# Patient Record
Sex: MALE | Race: WHITE | HISPANIC OR LATINO | Employment: FULL TIME | URBAN - METROPOLITAN AREA
[De-identification: names, ages, dates, MRNs, and addresses within clinical notes are randomized per-mention and may not be internally consistent; named-entity substitution may affect disease eponyms.]

---

## 2020-08-10 ENCOUNTER — OFFICE VISIT (OUTPATIENT)
Dept: FAMILY MEDICINE CLINIC | Facility: CLINIC | Age: 45
End: 2020-08-10

## 2020-08-10 VITALS
RESPIRATION RATE: 20 BRPM | DIASTOLIC BLOOD PRESSURE: 78 MMHG | TEMPERATURE: 98.2 F | HEIGHT: 67 IN | WEIGHT: 200.8 LBS | BODY MASS INDEX: 31.52 KG/M2 | HEART RATE: 65 BPM | OXYGEN SATURATION: 97 % | SYSTOLIC BLOOD PRESSURE: 130 MMHG

## 2020-08-10 DIAGNOSIS — Z11.59 NEED FOR HEPATITIS B SCREENING TEST: ICD-10-CM

## 2020-08-10 DIAGNOSIS — J30.9 ALLERGIC RHINITIS, UNSPECIFIED SEASONALITY, UNSPECIFIED TRIGGER: ICD-10-CM

## 2020-08-10 DIAGNOSIS — E66.09 CLASS 1 OBESITY DUE TO EXCESS CALORIES WITHOUT SERIOUS COMORBIDITY WITH BODY MASS INDEX (BMI) OF 31.0 TO 31.9 IN ADULT: Primary | ICD-10-CM

## 2020-08-10 DIAGNOSIS — Z11.4 SCREENING FOR HIV (HUMAN IMMUNODEFICIENCY VIRUS): ICD-10-CM

## 2020-08-10 DIAGNOSIS — Z11.59 NEED FOR HEPATITIS C SCREENING TEST: ICD-10-CM

## 2020-08-10 PROCEDURE — 99203 OFFICE O/P NEW LOW 30 MIN: CPT | Performed by: FAMILY MEDICINE

## 2020-08-10 PROCEDURE — 3725F SCREEN DEPRESSION PERFORMED: CPT | Performed by: FAMILY MEDICINE

## 2020-08-10 PROCEDURE — 1036F TOBACCO NON-USER: CPT | Performed by: FAMILY MEDICINE

## 2020-08-10 PROCEDURE — 3008F BODY MASS INDEX DOCD: CPT | Performed by: FAMILY MEDICINE

## 2020-08-10 RX ORDER — LORATADINE 10 MG/1
10 TABLET ORAL DAILY
Qty: 30 TABLET | Refills: 5 | Status: SHIPPED | OUTPATIENT
Start: 2020-08-10 | End: 2022-06-08

## 2020-08-10 NOTE — PATIENT INSTRUCTIONS
Alergias   CUIDADO AMBULATORIOSallee Brandt  son Charanjit Swapnil reacción del sistema inmunológico a joseph sustancia llamada alérgeno  El sistema inmunológico ve el alérgeno kyung joseph amenaza y lo ataca  Los signos y síntomas más comunes incluyen los siguientes:   · Síntomas leves  incluyen estornudos y flujo, picazón o congestión nasal  También puede presentar ojos inflamados, llorosos o con picazón, o picazón en la piel  Puede tener inflamación o dolor en el área de la picadura de insecto  · Los síntomas de la anafilaxia  incluyen dificultad para respirar o tragar, un salpullido o ronchas al igual que inflamación severa  También puede tener tos, un hailee silbante y chillón en el pecho al exhalar conocido kyung sibilancias, o joseph sensación de desvanecimiento o mareos  La anafilaxia es joseph reacción repentina y de peligro mortal que requiere de tratamiento inmediato  Llame al 911 en lawrence de presentar signos o síntomas de anafilaxia,  kyung dificultad para respirar, inflamación en pulliam boca o garganta, o sibilancias  También es posible que tenga comezón, sarpullido, urticaria o sienta que se va a desmayar  Busque atención médica de inmediato si:   · Tiene la sensación de hormigueo en las pascale o pies  · Pulliam piel está enrojecida o P[place] Poster  Pregúntele a pulliam Heddy Porsche vitaminas y minerales son adecuados para usted  · Usted tiene preguntas o inquietudes acerca de pulliam condición o cuidado  Pautas que necesito seguir para los signos o síntomas de la anafilaxia:   · Inmediatamente  aplíquese 1 inyección de epinefrina ricarda hágalo solamente en el músculo del muslo que da hacia afuera  · Deje la inyección en el lugar  según las indicaciones  Es probable que pulliam médico le recomiende que se la deje puesta por hasta 10 segundos antes de quitarla  [place] ayuda a asegurarse de que toda la epinefrina sea aplicada  · Llame al 911 y vaya al servicio de [place],  aunque la inyección haya skyler mario síntomas   No maneje usted mismo  Lleve con usted la inyección de epinefrina que usó  De tratamientos para las alergias  podría incluir cualquiera de los siguientes:  · Los antihistamínicos  sirven para reducir el comezón, estornudo e inflamación  Usted los puede curt en forma de píldora o de gotas en mario ojos o nariz  · Los descongestionantes  ayudan a que pulliam nariz se sienta menos congestionada  · Esteroides  disminuyen la inflamación y el enrojecimiento  · Los tratamientos de uso tópico  ayudan a reducir el comezón o inflamación  Usted también podría recibir [employer] o gotas para los ojos  · La epinefrina  es un medicamento usado para tratar reacciones alérgicas graves kyung la [place]  · La insensibilización  hace que el cuerpo se acostumbre a los alérgenos que no puede evitar  Pulliam médico le administra joseph inyección que contiene joseph pequeña cantidad de alérgeno  El médico trata cualquier reacción alérgica que usted pueda tener  Luego le [employer] alérgeno poco a poco hasta que pulliam cuerpo se acostumbre a éste  Pulliam reacción al alérgeno podría ser menos seria después de éste tratamiento  Pulliam médico le va a indicar por cuánto tiempo usted va a utilizar las inyecciones  Precauciones de seguridad a curt si usted corre riesgo de anafilaxia:   · Tenga a mano 2 inyecciones de epinefrina en todo momento  Puede que usted necesite joseph segunda inyección ya que la epinefrina solamente actúa por aproximadamente 20 minutos y los síntomas podrían regresar  Pulliam médico puede mostrarle a usted y a mario familiares cómo aplicar la inyección  Revise la fecha de vencimiento todos los meses y reemplace el medicamento de pulliam vencimiento  · [place] un plan de acción  Pulliam médico puede ayudarle a crear un plan escrito que explique la alergia y un plan de emergencia para tratar joseph reacción  El plan explica cuándo aplicar joseph segunda inyección de epinefrina si los síntomas vuelven o no mejoran después de la primera inyección  Asegúrese de que tiara familiares, personal de pulliam trabajo y escuela tengan joseph copia del plan de acción e instrucciones de emergencia  Muéstreles cómo aplicar la inyección de epinefrina  · Tenga cuidado cuando arnaldo ejercicio  Si usted tuvo anafilaxis inducida por el ejercicio, no se ejercite inmediatamente después de comer  Pare de ejercitarse de inmediato si empieza a desarrollar cualquier signo o síntoma de anafilaxia  Puede que al principio se sienta cansado, caliente o tenga comezón en la piel  También podría desarrollar urticaria, inflamación y problemas graves de respiración si continúa ejercitándose  · Lleve joseph identificación de [employer]  Use un brazalete o collar de alerta médica o lleve joseph tarjeta que explique la alergia  Pregúntele a pulliam médico dónde conseguir estos artículos  · Informe a todos los [place]  Estos incluyen a los odontólogos, enfermeras, médicos y cirujanos  Controle las alergias:   · Use enjuagues nasales según las indicaciones  El lavado nasal diario podría ayudar a limpiar pulliam nariz de alérgenos  · No fume  Los síntomas de la alergia disminuyen si no está alrededor del humo  La nicotina y otras sustancias químicas que contienen los cigarrillos y cigarros pueden dañar los pulmones  Pida información a pulliam médico si usted actualmente fuma y necesita ayuda para dejar de fumar  Los cigarrillos electrónicos o tabaco sin humo todavía contienen nicotina  Consulte con pulliam médico antes de [employer]  Prevenga joseph reacción alérgica:   · No salga afuera cuando el recuento de polen esté muy alto en law[place] de sufrir de alergias estacionales  Tiara síntomas podrían mejorar si usted sale afuera solo por la mañana o la noche  Use el aire acondicionado y [employer] filtros de aire a [place]  · Evite el polvo, pelaje y moho  Limpie el polvo y aspire pulliam hogar a menudo  Es posible que usted necesite usar joseph máscara al [place]   Mantenga a las mascotas en ciertas habitaciones y báñelos frecuentemente  Use un deshumidificador (máquina que reduce la humedad) para ayudar a evitar el moho  · No use productos que contengan látex en [place] de tener alergia al látex  Si usted trabaja en la cassy de la oswaldo o preparando alimentos, utilice guantes sin látex  Siempre informe a los médicos si usted tiene joseph alergia al látex  · Evite áreas o actividades que atraen a los insectos en lawrence que usted tenga joseph alergia a las picaduras de insectos  Las [employer] botes aakash Villalta y roland aakash [facility]  No use ropa de colores brillantes ni perfumes latoya al estar afuera  Acuda a mario consultas de control con reynaga médico según le indicaron  Anote mario preguntas para que se acuerde de hacerlas drew mario visitas  Cuando tenga joseph reacción alérgica, anote todo a lo que estuvo expuesto en las últimas 2 horas antes de la reacción  Lleve esta información a reynaga próxima vero  © 2017 [address] [employer] Information is for End User's use only and may not be sold, redistributed or otherwise used for commercial purposes  All illustrations and images included in CareNotes® are the copyrighted property of A D A M , Inc  or Nelson Amaya  Esta información es sólo para uso en educación  Reynaga intención no es darle un consejo médico sobre enfermedades o tratamientos  Colsulte con reynaga Kay Marquez farmacéutico antes de seguir cualquier régimen médico para saber si es seguro y efectivo para usted

## 2020-08-10 NOTE — PROGRESS NOTES
Chief Complaint   Patient presents with    Allergic Reaction     Blood Pressure: 130/78, Pulse: 65, Respirations: 20, Temperature: 98 2 °F (36 8 °C), Temp Source: Temporal, SpO2: 97 %, Weight - Scale: 91 1 kg (200 lb 12 8 oz), Height: 5' 7" (170 2 cm), Pain Score: 0-No pain     Assessment/Plan  1  Class 1 obesity  Labs indicated include A1c and lipid panel for screening purposes  Continue with exercise and lifestyle modification  His build is muscular side presumed that his BMI is artificially elevated  2   allergies  Recommended restarting nonsedating antihistamine in the morning and if symptoms are persisting he can use Benadryl at night  We discussed that he does not really need to take the medication every day unless she is symptomatic and after 1 week he can switch to an as-needed basis unless his symptoms recur and persist   3    RTO p r n       The above was discussed in layman's terms, the patient was engaged using the shared-decision making process and verbalized understanding of the treatment plan  All questions were answered to the patient's satisfaction  Diagnoses and all orders for this visit:    Class 1 obesity due to excess calories without serious comorbidity with body mass index (BMI) of 31 0 to 31 9 in adult  -     Hemoglobin A1C; Future  -     Lipid panel; Future    Screening for HIV (human immunodeficiency virus)  -     HIV 1/2 Antigen/Antibody (4th Generation) w Reflex [facility]; Future    Allergic rhinitis, unspecified seasonality, unspecified trigger  -     loratadine (CLARITIN) 10 mg tablet; Take 1 tablet (10 mg total) by mouth daily    Need for hepatitis B screening test  -     Hepatitis B surface antigen; Future    Need for hepatitis C screening test  -     Hepatitis C antibody; Future          Subjective/HPI  1  Patient Is not well known to me  2   new patient  Frisian speaker  Prefers daughter was present with him to translate      3  Complains of allergy symptoms for many years   Symptoms include itching of his arms, runny nose, watering eyes  He takes Benadryl and it resolves the symptoms but makes him sleepy  In the past he has tried nonsedating antihistamines but was not aware that he could continue to take them as needed going forward  All symptoms resolve after taking antihistamines  4  He a would like a chest x-ray and all blood work done  Specifies hepatitis B and C, as well as prostate  He has no past medical history but is concerned because his father passed away from lung cancer, but he was also a smoker  5    Last tetanus shot done 6 years ago  Review of Systems  Constitutional: Negative for activity change, appetite change, chills and fever  Respiratory: Negative for shortness of breath  Cardiovascular: Negative for chest pain  Gastrointestinal: Negative for blood in stool, nausea and vomiting  Genitourinary: Negative for difficulty urinating and dysuria  Psychiatric/Behavioral: Negative for behavioral problems  Pertinent items are noted in chief complaint and HPI  Social History   reports that he has never smoked  He has never used smokeless tobacco     has no history on file for alcohol     has no history on file for drug  Objective  Physical Exam   Constitutional: He is oriented to person, place, and time  He appears well-developed and well-nourished  No distress  HENT:   Head: Normocephalic and atraumatic  Eyes: Conjunctivae are normal    Neck: Normal range of motion  Cardiovascular: Normal rate, regular rhythm and normal heart sounds  No murmur heard  Pulmonary/Chest: Effort normal and breath sounds normal  No respiratory distress  He has no wheezes  Musculoskeletal: Normal range of motion  He exhibits no edema  Neurological: He is alert and oriented to person, place, and time  Psychiatric: He has a normal mood and affect  His behavior is normal    Nursing note and vitals reviewed      This note has been dictated using M*Modal software  It may contain errors, including improperly dictated words  Please contact physician directly for any questions  Chart Review/Health Maintenance   The following have been updated:   Medications    No Known Allergies  Current Outpatient Medications:     loratadine (CLARITIN) 10 mg tablet, Take 1 tablet (10 mg total) by mouth daily, Disp: 30 tablet, Rfl: 5    Patient Active Problem List   Diagnosis    Class 1 obesity due to excess calories without serious comorbidity with body mass index (BMI) of 31 0 to 31 9 in adult     No past surgical history on file    Family History   Problem Relation Age of Onset    Lung cancer Father      Health Maintenance   Topic Date Due    HIV Screening  08/01/1992    BMI: Followup Plan  08/01/1995    Annual Physical  08/01/1995    Influenza Vaccine  07/01/2020    DTaP,Tdap,and Td Vaccines (1 - Tdap) 01/01/2024 (Originally 8/1/1998)    Depression Screening PHQ  08/10/2021    BMI: Adult  08/10/2021    Pneumococcal Vaccine: Pediatrics (0 to 5 Years) and At-Risk Patients (6 to 59 Years)  Aged Out    HIB Vaccine  Aged Out    Hepatitis B Vaccine  Aged Out    IPV Vaccine  Aged Out    Hepatitis A Vaccine  Aged Out    Meningococcal ACWY Vaccine  Aged Out    HPV Vaccine  Aged Out

## 2020-08-12 ENCOUNTER — APPOINTMENT (OUTPATIENT)
Dept: LAB | Facility: CLINIC | Age: 45
End: 2020-08-12
Payer: COMMERCIAL

## 2020-08-12 DIAGNOSIS — Z11.59 NEED FOR HEPATITIS B SCREENING TEST: ICD-10-CM

## 2020-08-12 DIAGNOSIS — E66.09 CLASS 1 OBESITY DUE TO EXCESS CALORIES WITHOUT SERIOUS COMORBIDITY WITH BODY MASS INDEX (BMI) OF 31.0 TO 31.9 IN ADULT: ICD-10-CM

## 2020-08-12 DIAGNOSIS — Z11.4 SCREENING FOR HIV (HUMAN IMMUNODEFICIENCY VIRUS): ICD-10-CM

## 2020-08-12 DIAGNOSIS — Z11.59 NEED FOR HEPATITIS C SCREENING TEST: ICD-10-CM

## 2020-08-12 LAB
CHOLEST SERPL-MCNC: 223 MG/DL (ref 50–200)
HBV SURFACE AG SER QL: NORMAL
HCV AB SER QL: NORMAL
HDLC SERPL-MCNC: 36 MG/DL
LDLC SERPL CALC-MCNC: 115 MG/DL (ref 0–100)
NONHDLC SERPL-MCNC: 187 MG/DL
TRIGL SERPL-MCNC: 362 MG/DL

## 2020-08-12 PROCEDURE — 86803 HEPATITIS C AB TEST: CPT

## 2020-08-12 PROCEDURE — 80061 LIPID PANEL: CPT

## 2020-08-12 PROCEDURE — 87340 HEPATITIS B SURFACE AG IA: CPT

## 2020-08-12 PROCEDURE — 87389 HIV-1 AG W/HIV-1&-2 AB AG IA: CPT

## 2020-08-12 PROCEDURE — 36415 COLL VENOUS BLD VENIPUNCTURE: CPT

## 2020-08-12 PROCEDURE — 83036 HEMOGLOBIN GLYCOSYLATED A1C: CPT

## 2020-08-13 LAB
EST. AVERAGE GLUCOSE BLD GHB EST-MCNC: 114 MG/DL
HBA1C MFR BLD: 5.6 %
HIV 1+2 AB+HIV1 P24 AG SERPL QL IA: NORMAL

## 2021-05-18 ENCOUNTER — IMMUNIZATIONS (OUTPATIENT)
Dept: FAMILY MEDICINE CLINIC | Facility: HOSPITAL | Age: 46
End: 2021-05-18

## 2021-05-18 DIAGNOSIS — Z23 ENCOUNTER FOR IMMUNIZATION: Primary | ICD-10-CM

## 2021-05-18 PROCEDURE — 91301 SARS-COV-2 / COVID-19 MRNA VACCINE (MODERNA) 100 MCG: CPT

## 2021-05-18 PROCEDURE — 0011A SARS-COV-2 / COVID-19 MRNA VACCINE (MODERNA) 100 MCG: CPT

## 2021-12-09 ENCOUNTER — OFFICE VISIT (OUTPATIENT)
Dept: FAMILY MEDICINE CLINIC | Facility: CLINIC | Age: 46
End: 2021-12-09

## 2021-12-09 VITALS
WEIGHT: 201 LBS | OXYGEN SATURATION: 95 % | HEIGHT: 68 IN | TEMPERATURE: 97.6 F | SYSTOLIC BLOOD PRESSURE: 118 MMHG | BODY MASS INDEX: 30.46 KG/M2 | DIASTOLIC BLOOD PRESSURE: 80 MMHG | RESPIRATION RATE: 18 BRPM | HEART RATE: 70 BPM

## 2021-12-09 DIAGNOSIS — K64.0 GRADE I HEMORRHOIDS: Primary | ICD-10-CM

## 2021-12-09 DIAGNOSIS — K62.89 ANAL IRRITATION: ICD-10-CM

## 2021-12-09 PROCEDURE — 99213 OFFICE O/P EST LOW 20 MIN: CPT | Performed by: FAMILY MEDICINE

## 2021-12-09 RX ORDER — HYDROCORTISONE 25 MG/G
CREAM TOPICAL 2 TIMES DAILY
Qty: 28 G | Refills: 1 | Status: SHIPPED | OUTPATIENT
Start: 2021-12-09

## 2022-06-08 ENCOUNTER — OFFICE VISIT (OUTPATIENT)
Dept: FAMILY MEDICINE CLINIC | Facility: CLINIC | Age: 47
End: 2022-06-08

## 2022-06-08 ENCOUNTER — APPOINTMENT (OUTPATIENT)
Dept: LAB | Facility: CLINIC | Age: 47
End: 2022-06-08
Payer: MEDICARE

## 2022-06-08 VITALS
RESPIRATION RATE: 18 BRPM | HEART RATE: 69 BPM | WEIGHT: 206 LBS | BODY MASS INDEX: 31.22 KG/M2 | DIASTOLIC BLOOD PRESSURE: 82 MMHG | TEMPERATURE: 97.2 F | HEIGHT: 68 IN | SYSTOLIC BLOOD PRESSURE: 118 MMHG | OXYGEN SATURATION: 95 %

## 2022-06-08 DIAGNOSIS — Z13.1 SCREENING FOR DIABETES MELLITUS: ICD-10-CM

## 2022-06-08 DIAGNOSIS — Z13.220 SCREENING FOR CHOLESTEROL LEVEL: Primary | ICD-10-CM

## 2022-06-08 DIAGNOSIS — Z00.00 ANNUAL PHYSICAL EXAM: ICD-10-CM

## 2022-06-08 DIAGNOSIS — T78.40XD ALLERGY, SUBSEQUENT ENCOUNTER: ICD-10-CM

## 2022-06-08 DIAGNOSIS — Z13.220 SCREENING FOR CHOLESTEROL LEVEL: ICD-10-CM

## 2022-06-08 DIAGNOSIS — Z76.89 ENCOUNTER TO ESTABLISH CARE: ICD-10-CM

## 2022-06-08 PROBLEM — T78.40XA ALLERGIES: Status: ACTIVE | Noted: 2022-06-08

## 2022-06-08 LAB
ALBUMIN SERPL BCP-MCNC: 3.7 G/DL (ref 3.5–5)
ALP SERPL-CCNC: 76 U/L (ref 46–116)
ALT SERPL W P-5'-P-CCNC: 38 U/L (ref 12–78)
ANION GAP SERPL CALCULATED.3IONS-SCNC: 8 MMOL/L (ref 4–13)
AST SERPL W P-5'-P-CCNC: 24 U/L (ref 5–45)
BILIRUB SERPL-MCNC: 0.63 MG/DL (ref 0.2–1)
BUN SERPL-MCNC: 19 MG/DL (ref 5–25)
CALCIUM SERPL-MCNC: 8.8 MG/DL (ref 8.3–10.1)
CHLORIDE SERPL-SCNC: 105 MMOL/L (ref 100–108)
CHOLEST SERPL-MCNC: 231 MG/DL
CO2 SERPL-SCNC: 27 MMOL/L (ref 21–32)
CREAT SERPL-MCNC: 1.01 MG/DL (ref 0.6–1.3)
GFR SERPL CREATININE-BSD FRML MDRD: 90 ML/MIN/1.73SQ M
GLUCOSE P FAST SERPL-MCNC: 107 MG/DL (ref 65–99)
HDLC SERPL-MCNC: 35 MG/DL
LDLC SERPL CALC-MCNC: 135 MG/DL (ref 0–100)
NONHDLC SERPL-MCNC: 196 MG/DL
POTASSIUM SERPL-SCNC: 4.4 MMOL/L (ref 3.5–5.3)
PROT SERPL-MCNC: 7.2 G/DL (ref 6.4–8.2)
SODIUM SERPL-SCNC: 140 MMOL/L (ref 136–145)
TRIGL SERPL-MCNC: 305 MG/DL

## 2022-06-08 PROCEDURE — 80061 LIPID PANEL: CPT

## 2022-06-08 PROCEDURE — 83036 HEMOGLOBIN GLYCOSYLATED A1C: CPT

## 2022-06-08 PROCEDURE — 80053 COMPREHEN METABOLIC PANEL: CPT

## 2022-06-08 PROCEDURE — 36415 COLL VENOUS BLD VENIPUNCTURE: CPT

## 2022-06-08 PROCEDURE — 99386 PREV VISIT NEW AGE 40-64: CPT | Performed by: FAMILY MEDICINE

## 2022-06-08 RX ORDER — MONTELUKAST SODIUM 10 MG/1
TABLET ORAL
COMMUNITY
Start: 2022-04-26

## 2022-06-08 RX ORDER — LEVOCETIRIZINE DIHYDROCHLORIDE 5 MG/1
TABLET, FILM COATED ORAL
COMMUNITY
Start: 2022-04-26

## 2022-06-08 NOTE — PROGRESS NOTES
Assessment/Plan:    Allergies  - Following with allergist  - Continue singuar and xyzal QD    Annual physical exam  - Immunizations UTD  - Screenings UTD       Diagnoses and all orders for this visit:    Screening for cholesterol level  -     Lipid panel; Future    Screening for diabetes mellitus  -     Comprehensive metabolic panel; Future  -     HEMOGLOBIN A1C W/ EAG ESTIMATION; Future    Encounter to establish care    Annual physical exam    Allergy, subsequent encounter    Other orders  -     levocetirizine (XYZAL) 5 MG tablet  -     montelukast (SINGULAIR) 10 mg tablet          Subjective:      Patient ID: Kate Madrigal is a 42 y o  male  Very pleasant 44-year-old male with past medical history of allergies who presents to the office today for establishment of care and annual physical exam   Patient reports that he has been doing well and is following with an allergist for his allergies  He has been compliant with the medications prescribed from the allergist   Currently denies any complaints at the moment  The following portions of the patient's history were reviewed and updated as appropriate: He  has no past medical history on file  He   Patient Active Problem List    Diagnosis Date Noted    Allergies 06/08/2022    Screening for cholesterol level 06/08/2022    Screening for diabetes mellitus 06/08/2022    Encounter to establish care 06/08/2022    Annual physical exam 06/08/2022    Anal irritation 12/09/2021    Class 1 obesity due to excess calories without serious comorbidity with body mass index (BMI) of 31 0 to 31 9 in adult 08/10/2020     He  has a past surgical history that includes No past surgeries  His family history includes Lung cancer in his father  He  reports that he has never smoked  He has never used smokeless tobacco  He reports current alcohol use  No history on file for drug use    Current Outpatient Medications   Medication Sig Dispense Refill    hydrocortisone (ANUSOL-HC) 2 5 % rectal cream Apply topically 2 (two) times a day 28 g 1    levocetirizine (XYZAL) 5 MG tablet       montelukast (SINGULAIR) 10 mg tablet        No current facility-administered medications for this visit       Review of Systems   Constitutional: Negative for fatigue and fever  HENT: Negative for congestion and sore throat  Eyes: Negative for visual disturbance  Respiratory: Negative for cough, shortness of breath and wheezing  Cardiovascular: Negative for chest pain, palpitations and leg swelling  Gastrointestinal: Negative for abdominal pain, anal bleeding, constipation, diarrhea, nausea and vomiting  Endocrine: Negative for cold intolerance and heat intolerance  Musculoskeletal: Negative for arthralgias and joint swelling  Skin: Negative for color change  Neurological: Negative for dizziness, seizures, syncope, weakness and light-headedness  Psychiatric/Behavioral: Negative for agitation, confusion, sleep disturbance and suicidal ideas  Objective:      /82 (BP Location: Left arm, Patient Position: Sitting, Cuff Size: Adult)   Pulse 69   Temp (!) 97 2 °F (36 2 °C) (Temporal)   Resp 18   Ht 5' 8" (1 727 m)   Wt 93 4 kg (206 lb)   SpO2 95%   BMI 31 32 kg/m²          Physical Exam  Constitutional:       Appearance: He is well-developed  HENT:      Head: Normocephalic and atraumatic  Eyes:      Conjunctiva/sclera: Conjunctivae normal       Pupils: Pupils are equal, round, and reactive to light  Neck:      Vascular: No JVD  Cardiovascular:      Rate and Rhythm: Normal rate and regular rhythm  Heart sounds: Normal heart sounds  No murmur heard  No friction rub  No gallop  Pulmonary:      Effort: Pulmonary effort is normal  No respiratory distress  Breath sounds: Normal breath sounds  No wheezing  Abdominal:      General: Bowel sounds are normal  There is no distension  Palpations: Abdomen is soft  Tenderness:  There is no abdominal tenderness  Musculoskeletal:         General: Normal range of motion  Cervical back: Normal range of motion and neck supple  Skin:     General: Skin is warm and dry  Neurological:      Mental Status: He is alert and oriented to person, place, and time  Deep Tendon Reflexes: Reflexes are normal and symmetric  Psychiatric:         Behavior: Behavior normal          Thought Content: Thought content normal          Judgment: Judgment normal          BMI Counseling: Body mass index is 31 32 kg/m²  The BMI is above normal  Nutrition recommendations include reducing portion sizes and decreasing overall calorie intake

## 2022-06-09 LAB
EST. AVERAGE GLUCOSE BLD GHB EST-MCNC: 117 MG/DL
HBA1C MFR BLD: 5.7 %

## 2022-10-11 PROBLEM — Z13.1 SCREENING FOR DIABETES MELLITUS: Status: RESOLVED | Noted: 2022-06-08 | Resolved: 2022-10-11

## 2022-12-14 ENCOUNTER — OFFICE VISIT (OUTPATIENT)
Dept: FAMILY MEDICINE CLINIC | Facility: CLINIC | Age: 47
End: 2022-12-14

## 2022-12-14 ENCOUNTER — APPOINTMENT (OUTPATIENT)
Dept: LAB | Facility: CLINIC | Age: 47
End: 2022-12-14

## 2022-12-14 VITALS
TEMPERATURE: 96.8 F | WEIGHT: 191 LBS | HEART RATE: 72 BPM | HEIGHT: 68 IN | RESPIRATION RATE: 18 BRPM | SYSTOLIC BLOOD PRESSURE: 114 MMHG | DIASTOLIC BLOOD PRESSURE: 82 MMHG | BODY MASS INDEX: 28.95 KG/M2 | OXYGEN SATURATION: 98 %

## 2022-12-14 DIAGNOSIS — E66.3 OVERWEIGHT (BMI 25.0-29.9): Primary | ICD-10-CM

## 2022-12-14 DIAGNOSIS — Z12.11 SCREENING FOR COLON CANCER: ICD-10-CM

## 2022-12-14 DIAGNOSIS — K64.0 GRADE I HEMORRHOIDS: ICD-10-CM

## 2022-12-14 DIAGNOSIS — E66.3 OVERWEIGHT (BMI 25.0-29.9): ICD-10-CM

## 2022-12-14 PROBLEM — Z00.00 ANNUAL PHYSICAL EXAM: Status: RESOLVED | Noted: 2022-06-08 | Resolved: 2022-12-14

## 2022-12-14 PROBLEM — Z76.89 ENCOUNTER TO ESTABLISH CARE: Status: RESOLVED | Noted: 2022-06-08 | Resolved: 2022-12-14

## 2022-12-14 PROBLEM — Z13.220 SCREENING FOR CHOLESTEROL LEVEL: Status: RESOLVED | Noted: 2022-06-08 | Resolved: 2022-12-14

## 2022-12-14 LAB
ANION GAP SERPL CALCULATED.3IONS-SCNC: 6 MMOL/L (ref 4–13)
BUN SERPL-MCNC: 20 MG/DL (ref 5–25)
CALCIUM SERPL-MCNC: 8.7 MG/DL (ref 8.3–10.1)
CHLORIDE SERPL-SCNC: 105 MMOL/L (ref 96–108)
CHOLEST SERPL-MCNC: 237 MG/DL
CO2 SERPL-SCNC: 28 MMOL/L (ref 21–32)
CREAT SERPL-MCNC: 0.88 MG/DL (ref 0.6–1.3)
GFR SERPL CREATININE-BSD FRML MDRD: 103 ML/MIN/1.73SQ M
GLUCOSE P FAST SERPL-MCNC: 101 MG/DL (ref 65–99)
HDLC SERPL-MCNC: 36 MG/DL
LDLC SERPL CALC-MCNC: 158 MG/DL (ref 0–100)
NONHDLC SERPL-MCNC: 201 MG/DL
POTASSIUM SERPL-SCNC: 3.9 MMOL/L (ref 3.5–5.3)
SODIUM SERPL-SCNC: 139 MMOL/L (ref 135–147)
TRIGL SERPL-MCNC: 214 MG/DL

## 2022-12-14 RX ORDER — HYDROCORTISONE 25 MG/G
CREAM TOPICAL 2 TIMES DAILY
Qty: 28 G | Refills: 1 | Status: SHIPPED | OUTPATIENT
Start: 2022-12-14

## 2022-12-14 NOTE — PROGRESS NOTES
Name: Irma Tinoco      : 1975      MRN: [id number]  Encounter Provider: Harlan Macario MD  Encounter Date: 2022   Encounter department: [facility]     1  Overweight (BMI 25 0-29  9)  Assessment & Plan:  - Has lost 16lbs  - Congratulated on effort and advised to continue    Orders:  -     Basic metabolic panel; Future  -     Lipid panel; Future    2  Grade I hemorrhoids  Assessment & Plan:  - Controlled on PRN use of hydrocortisone    Orders:  -     hydrocortisone (ANUSOL-HC) 2 5 % rectal cream; Apply topically 2 (two) times a day    3  Screening for colon cancer  -     Vannessa Roberts      Pleasant 53-year-old male with history of allergies and overweight who comes to the office today for follow-up of his weight  Patient reports that he is lost 16 pounds which is evident on his weight today  [place], he reports he is doing well and compliant with his medications complaints  Review of Systems   Constitutional: Negative for fatigue and fever  HENT: Negative for congestion and sore throat  Eyes: Negative for visual disturbance  Respiratory: Negative for cough, shortness of breath and wheezing  Cardiovascular: Negative for chest pain, palpitations and leg swelling  Gastrointestinal: Negative for abdominal pain, anal bleeding, constipation, diarrhea, nausea and vomiting  Endocrine: Negative for cold intolerance and heat intolerance  Musculoskeletal: Negative for arthralgias and joint swelling  Skin: Negative for color change  Neurological: Negative for dizziness, seizures, syncope, weakness and light-headedness  Psychiatric/Behavioral: Negative for agitation, confusion, sleep disturbance and suicidal ideas         Current Outpatient Medications on File Prior to Visit   Medication Sig   • levocetirizine (XYZAL) 5 MG tablet    • montelukast (SINGULAIR) 10 mg tablet    • [DISCONTINUED] hydrocortisone (ANUSOL-HC) 2 5 % rectal cream Apply topically 2 (two) times a day       Objective     /82 (BP Location: Left arm, Patient Position: Sitting, Cuff Size: Adult)   Pulse 72   Temp (!) 96 8 °F (36 °C) (Temporal)   Resp 18   Ht 5' 8" (1 727 m)   Wt 86 6 kg (191 lb)   SpO2 98%   BMI 29 04 kg/m²     Physical Exam  Constitutional:       Appearance: He is well-developed  HENT:      Head: Normocephalic and atraumatic  Eyes:      Conjunctiva/sclera: Conjunctivae normal       Pupils: Pupils are equal, round, and reactive to light  Neck:      Vascular: No JVD  Cardiovascular:      Rate and Rhythm: Normal rate and regular rhythm  Heart sounds: Normal heart sounds  No murmur heard  No friction rub  No gallop  Pulmonary:      Effort: Pulmonary effort is normal  No respiratory distress  Breath sounds: Normal breath sounds  No wheezing  Abdominal:      General: Bowel sounds are normal  There is no distension  Palpations: Abdomen is soft  Tenderness: There is no abdominal tenderness  Musculoskeletal:         General: Normal range of motion  Cervical back: Normal range of motion and neck supple  Skin:     General: Skin is warm and dry  Neurological:      Mental Status: He is alert and oriented to person, place, and time  Deep Tendon Reflexes: Reflexes are normal and symmetric  Psychiatric:         Behavior: Behavior normal          Thought Content:  Thought content normal          Judgment: Judgment normal        Anabella Jolly MD

## 2023-01-16 LAB — COLOGUARD RESULT REPORTABLE: NEGATIVE
